# Patient Record
Sex: FEMALE | Race: BLACK OR AFRICAN AMERICAN
[De-identification: names, ages, dates, MRNs, and addresses within clinical notes are randomized per-mention and may not be internally consistent; named-entity substitution may affect disease eponyms.]

---

## 2019-12-11 ENCOUNTER — HOSPITAL ENCOUNTER (EMERGENCY)
Dept: HOSPITAL 63 - ER | Age: 59
Discharge: HOME | End: 2019-12-11
Payer: MEDICAID

## 2019-12-11 VITALS — DIASTOLIC BLOOD PRESSURE: 95 MMHG | SYSTOLIC BLOOD PRESSURE: 173 MMHG

## 2019-12-11 VITALS — BODY MASS INDEX: 41.83 KG/M2 | WEIGHT: 245 LBS | HEIGHT: 64 IN

## 2019-12-11 DIAGNOSIS — T16.2XXA: Primary | ICD-10-CM
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
## 2019-12-11 DIAGNOSIS — Z91.018: ICD-10-CM
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
## 2019-12-11 DIAGNOSIS — Z88.0: ICD-10-CM
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
## 2019-12-11 PROCEDURE — 69200 CLEAR OUTER EAR CANAL: CPT

## 2019-12-11 PROCEDURE — 99284 EMERGENCY DEPT VISIT MOD MDM: CPT

## 2019-12-11 NOTE — PHYS DOC
Adult General


Chief Complaint


Chief Complaint:  FOREIGNBODY EAR





HPI


HPI


59-year-old female presents with foreign body in left ear. The patient was 

sleeping when she woke up and felt like something was crawling on the side of 

her face. She swatted at it and she felt it climb into her ear. The patient felt

like she had an insect in her left ear. She put peroxide in there and after a 

time it seemed to is quit moving. She came to the emergency room to have it 

removed. She denies any other concerns or complaints.





Review of Systems


Review of Systems





Constitutional: Denies fever or chills []


Eyes: Denies change in visual acuity, redness, or eye pain []


HENT: Denies nasal congestion or sore throat. Foreign body left ear[]


Respiratory: Denies cough or shortness of breath []


Cardiovascular: No additional information not addressed in HPI []


GI: Denies abdominal pain, nausea, vomiting, bloody stools or diarrhea []


: Denies dysuria or hematuria []


Musculoskeletal: Denies back pain or joint pain []


Integument: Denies rash or skin lesions []


Neurologic: Denies headache, focal weakness or sensory changes []


Endocrine: Denies polyuria or polydipsia []





All other systems were reviewed and found to be within normal limits, except as 

documented in this note.





Allergies


Allergies





Allergies








Coded Allergies Type Severity Reaction Last Updated Verified


 


  Penicillins Allergy Severe Anaphylaxis 12/11/19 Yes


 


  strawberry Allergy Intermediate  12/11/19 Yes











Physical Exam


Physical Exam





Constitutional: Well developed, well nourished, no acute distress, non-toxic 

appearance. []


HENT: Normocephalic, atraumatic, bilateral external ears normal, oropharynx 

moist, no oral exudates, nose normal. Insect in left ear canal.[]


Eyes: PERRLA, EOMI, conjunctiva normal, no discharge. [] 


Neck: Normal range of motion, no tenderness, supple, no stridor. [] 


Cardiovascular:Heart rate regular rhythm, no murmur []


Lungs & Thorax:  Bilateral breath sounds clear to auscultation []


Abdomen: Bowel sounds normal, soft, no tenderness, no masses, no pulsatile 

masses. [] 


Skin: Warm, dry, no erythema, no rash. [] 


Back: No tenderness, no CVA tenderness. [] 


Extremities: No tenderness, no cyanosis, no clubbing, ROM intact, no edema. [] 


Neurologic: Alert and oriented X 3, normal motor function, normal sensory 

function, no focal deficits noted. []


Psychologic: Affect normal, judgement normal, mood normal. []





EKG


EKG


[]





Radiology/Procedures


Radiology/Procedures


[]





Course & Med Decision Making


Course & Med Decision Making


Pertinent Labs and Imaging studies reviewed. (See chart for details)


The patient did have an insect in her left ear. It did not appear to be alive. I

 was able to remove it with small alligator forceps. It was completely removed. 

Her right ear had no foreign bodies. Patient is feeling better at this time. She

 is stable for discharge.


[]





Dragon Disclaimer


Dragon Disclaimer


This electronic medical record was generated, in whole or in part, using a voice

 recognition dictation system.





Departure


Departure:


Impression:  


   Primary Impression:  


   Ear foreign body


Disposition:  01 HOME, SELF-CARE


Condition:  IMPROVED


Referrals:  


PCP,NO (PCP)


Patient Instructions:  Ear Foreign Body, Easy-to-Read





Problem Qualifiers








   Primary Impression:  


   Ear foreign body


   Encounter type:  initial encounter  Laterality:  left  Qualified Codes:  

   T16.2XXA - Foreign body in left ear, initial encounter








GEO PIÑA DO                 Dec 11, 2019 02:40